# Patient Record
Sex: MALE | Race: WHITE | NOT HISPANIC OR LATINO | URBAN - METROPOLITAN AREA
[De-identification: names, ages, dates, MRNs, and addresses within clinical notes are randomized per-mention and may not be internally consistent; named-entity substitution may affect disease eponyms.]

---

## 2017-01-05 ENCOUNTER — COMMUNICATION - HEALTHEAST (OUTPATIENT)
Dept: PEDIATRICS | Facility: CLINIC | Age: 22
End: 2017-01-05

## 2017-01-05 DIAGNOSIS — J45.40 MODERATE PERSISTENT ASTHMA WITHOUT COMPLICATION: ICD-10-CM

## 2017-01-06 ENCOUNTER — COMMUNICATION - HEALTHEAST (OUTPATIENT)
Dept: PEDIATRICS | Facility: CLINIC | Age: 22
End: 2017-01-06

## 2017-03-07 ENCOUNTER — COMMUNICATION - HEALTHEAST (OUTPATIENT)
Dept: PEDIATRICS | Facility: CLINIC | Age: 22
End: 2017-03-07

## 2017-03-07 DIAGNOSIS — J45.40 MODERATE PERSISTENT ASTHMA WITHOUT COMPLICATION: ICD-10-CM

## 2017-03-31 ENCOUNTER — OFFICE VISIT - HEALTHEAST (OUTPATIENT)
Dept: PEDIATRICS | Facility: CLINIC | Age: 22
End: 2017-03-31

## 2017-03-31 DIAGNOSIS — F41.9 ANXIETY: ICD-10-CM

## 2017-04-26 ENCOUNTER — OFFICE VISIT - HEALTHEAST (OUTPATIENT)
Dept: PEDIATRICS | Facility: CLINIC | Age: 22
End: 2017-04-26

## 2017-04-26 DIAGNOSIS — F41.9 ANXIETY: ICD-10-CM

## 2017-04-26 DIAGNOSIS — J45.20 INTERMITTENT ASTHMA WITHOUT COMPLICATION: ICD-10-CM

## 2017-04-26 DIAGNOSIS — H92.01 OTALGIA, RIGHT: ICD-10-CM

## 2017-05-17 ENCOUNTER — RECORDS - HEALTHEAST (OUTPATIENT)
Dept: ADMINISTRATIVE | Facility: OTHER | Age: 22
End: 2017-05-17

## 2018-12-10 ENCOUNTER — COMMUNICATION - HEALTHEAST (OUTPATIENT)
Dept: PEDIATRICS | Facility: CLINIC | Age: 23
End: 2018-12-10

## 2019-06-13 ENCOUNTER — COMMUNICATION - HEALTHEAST (OUTPATIENT)
Dept: PEDIATRICS | Facility: CLINIC | Age: 24
End: 2019-06-13

## 2019-06-14 ENCOUNTER — OFFICE VISIT - HEALTHEAST (OUTPATIENT)
Dept: PEDIATRICS | Facility: CLINIC | Age: 24
End: 2019-06-14

## 2019-06-14 DIAGNOSIS — L30.9 ECZEMA, UNSPECIFIED TYPE: ICD-10-CM

## 2019-06-14 DIAGNOSIS — B08.1 MOLLUSCUM CONTAGIOSUM: ICD-10-CM

## 2019-06-14 DIAGNOSIS — J45.20 MILD INTERMITTENT ASTHMA WITHOUT COMPLICATION: ICD-10-CM

## 2019-06-14 RX ORDER — HYDROCORTISONE 25 MG/G
OINTMENT TOPICAL 2 TIMES DAILY PRN
Qty: 30 G | Refills: 1 | Status: SHIPPED | OUTPATIENT
Start: 2019-06-14

## 2019-06-14 ASSESSMENT — MIFFLIN-ST. JEOR: SCORE: 1597.25

## 2019-11-05 ENCOUNTER — COMMUNICATION - HEALTHEAST (OUTPATIENT)
Dept: PEDIATRICS | Facility: CLINIC | Age: 24
End: 2019-11-05

## 2019-11-05 DIAGNOSIS — J45.20 MILD INTERMITTENT ASTHMA WITHOUT COMPLICATION: ICD-10-CM

## 2019-11-06 ENCOUNTER — COMMUNICATION - HEALTHEAST (OUTPATIENT)
Dept: PEDIATRICS | Facility: CLINIC | Age: 24
End: 2019-11-06

## 2019-11-06 DIAGNOSIS — J45.20 MILD INTERMITTENT ASTHMA WITHOUT COMPLICATION: ICD-10-CM

## 2019-11-06 RX ORDER — ALBUTEROL SULFATE 90 UG/1
2 AEROSOL, METERED RESPIRATORY (INHALATION) EVERY 4 HOURS PRN
Qty: 18 G | Refills: 5 | Status: SHIPPED | OUTPATIENT
Start: 2019-11-06

## 2021-05-28 ENCOUNTER — RECORDS - HEALTHEAST (OUTPATIENT)
Dept: ADMINISTRATIVE | Facility: CLINIC | Age: 26
End: 2021-05-28

## 2021-05-29 NOTE — TELEPHONE ENCOUNTER
I am okay with seeing Rupesh, and will help him transition to adult medicine at our visit.  Please help him schedule a visit with me.

## 2021-05-29 NOTE — PROGRESS NOTES
ASSESSMENT:  1. Eczema, unspecified type  I suggested starting frequent emollient application to the rash on his left arm and his right lower back, and prescription was given for hydrocortisone, as below, to apply twice daily to flares.    - hydrocortisone 2.5 % ointment; Apply topically 2 (two) times a day as needed.  Dispense: 30 g; Refill: 1    2. Mild intermittent asthma without complication  Refill is given on albuterol inhaler, and we reviewed albuterol use before physical activity, if needed.    - VENTOLIN HFA 90 mcg/actuation inhaler; Inhale 2 puffs every 4 (four) hours as needed for wheezing.  Dispense: 18 g; Refill: 0    3. Molluscum contagiosum  Cryotherapy was applied x3 to 7 lesions, with liquid nitrogen, with greater than 10-second thaw each time, without complication.  We discussed the epidemiology of molluscum contagiosum infection, and prevention.  Written information was provided.    Td and HPV vaccine will be given today.    We discussed transition to adult care, and I encouraged Rupesh to schedule a visit with one of our  physicians.      PLAN:  There are no Patient Instructions on file for this visit.    Orders Placed This Encounter   Procedures     Td, Adult, Adsorbed (blue label)     HPV vaccine 9 valent 3 dose IM     Medications Discontinued During This Encounter   Medication Reason     VENTOLIN HFA 90 mcg/actuation inhaler Reorder       No follow-ups on file.    CHIEF COMPLAINT:  Chief Complaint   Patient presents with     Rash     x 3-4 months on the left arm and on the hip- has some bumps from the belly button to the groin area.       HISTORY OF PRESENT ILLNESS:  Jeet is a 23 y.o. male presenting to the clinic today with several concerns.  He would like to discuss transition to adult care.  He has also had a mildly dry and itchy rash on his left upper arm and his right lower back for the last several months.  He is wondering whether it is ringworm.  He has not applied any OTC products.   "He is also had a rash for the past 2 weeks that he describes as bumps between his umbilicus in his groin.  He is sexually active in a monogamous relationship.      REVIEW OF SYSTEMS:    All other systems are negative.    PFSH:  He is currently working in [place], at [employer], and going to college, to study finance.    TOBACCO USE:  Social History     Tobacco Use   Smoking Status Never Smoker   Smokeless Tobacco Never Used       VITALS:  Vitals:    06/14/19 1644   BP: 110/80   Patient Site: Left Arm   Patient Position: Sitting   Cuff Size: Adult Regular   Pulse: 82   Temp: 97.8  F (36.6  C)   TempSrc: Oral   Weight: 141 lb 11.2 oz (64.3 kg)   Height: 5' 8\" (1.727 m)     Wt Readings from Last 3 Encounters:   06/14/19 141 lb 11.2 oz (64.3 kg)   04/26/17 142 lb 9.6 oz (64.7 kg)   08/09/16 144 lb 11.2 oz (65.6 kg)     Body mass index is 21.55 kg/m .    PHYSICAL EXAM:  Alert, no acute distress.   Skin, there are mildly erythematous diffuse plaques without visible scale or lichenification on the left medial distal bicep and right lower back.  There are also multiple molluscum contagiosum lesions on the lower abdomen.      MEDICATIONS:  Current Outpatient Medications   Medication Sig Dispense Refill     VENTOLIN HFA 90 mcg/actuation inhaler Inhale 2 puffs every 4 (four) hours as needed for wheezing. 18 g 0     hydrocortisone 2.5 % ointment Apply topically 2 (two) times a day as needed. 30 g 1     No current facility-administered medications for this visit.            "

## 2021-05-29 NOTE — TELEPHONE ENCOUNTER
New Appointment Needed  What is the reason for the visit:  This patient has requested via [employer] to see Dr. Lau.  Patient is 23 years old, unsure if this patient can continue seeing him at this age.   Same Date/Next Day Appt Request  What is the reason for your visit?: Td 18+ He/Also have a rash that I would like opinion on. Inhaler refills as well.        Provider Preference: PCP only  How soon do you need to be seen?: after June 17th  Waitlist offered?: No  Okay to leave a detailed message:  Not able to ask, requested via [employer]

## 2021-05-30 VITALS — WEIGHT: 142.6 LBS | BODY MASS INDEX: 21.68 KG/M2

## 2021-06-01 ENCOUNTER — RECORDS - HEALTHEAST (OUTPATIENT)
Dept: ADMINISTRATIVE | Facility: CLINIC | Age: 26
End: 2021-06-01

## 2021-06-02 ENCOUNTER — RECORDS - HEALTHEAST (OUTPATIENT)
Dept: ADMINISTRATIVE | Facility: CLINIC | Age: 26
End: 2021-06-02

## 2021-06-03 VITALS — HEIGHT: 68 IN | WEIGHT: 141.7 LBS | BODY MASS INDEX: 21.47 KG/M2

## 2021-06-03 NOTE — TELEPHONE ENCOUNTER
Medication Question or Clarification  Who is calling: Patient  What medication are you calling about? (include dose and sig)   VENTOLIN HFA 90 mcg/actuation inhaler  2 puff, Inhalation, Every 4 hours PRN         Summary: Inhale 2 puffs every 4 (four) hours as needed for wheezing., Starting Tue 11/5/2019, Normal   Dose, Frequency: 2 puff, Every 4 hours PRN  Start: 11/5/2019  Ord/Sold: 11/5/2019 (O)          Who prescribed the medication?: Dr Lau  What is your question/concern?: The patient requested this medication be sent to [address]. Please advise.  Pharmacy:   Okay to leave a detailed message?: Yes  Site CMT - Please call the pharmacy to obtain any additional needed information.  
02-Sep-2018 20:17

## 2021-06-03 NOTE — TELEPHONE ENCOUNTER
Refill Approved    Rx renewed per Medication Renewal Policy. Medication was last renewed on 6/14/19.    Iona Pratt, [facility] Triage/Med Refill 11/5/2019     Requested Prescriptions   Pending Prescriptions Disp Refills     VENTOLIN HFA 90 mcg/actuation inhaler 18 g 0     Sig: Inhale 2 puffs every 4 (four) hours as needed for wheezing.       Albuterol/Levalbuterol Refill Protocol Passed - 11/5/2019  9:28 PM        Passed - PCP or prescribing provider visit in last year     Last office visit with prescriber/PCP: 6/14/2019 Mauro Lau MD OR same dept: 6/14/2019 Mauro Lau MD OR same specialty: 6/14/2019 Mauro Lau MD Last physical: Visit date not found       Next appt within 3 mo: Visit date not found  Next physical within 3 mo: Visit date not found  Prescriber OR PCP: Mauro Lau MD  Last diagnosis associated with med order: 1. Mild intermittent asthma without complication  - VENTOLIN HFA 90 mcg/actuation inhaler; Inhale 2 puffs every 4 (four) hours as needed for wheezing.  Dispense: 18 g; Refill: 0    If protocol passes may refill for 6 months if within 3 months of last provider visit (or a total of 9 months). If patient requesting >1 inhaler per month refill x 6 months and have patient make appointment with provider.

## 2021-06-09 NOTE — PROGRESS NOTES
ASSESSMENT/PLAN:  1. Anxiety  We discussed anxiety and depression signs, symptoms, and treatment.  Rupesh verbally contracted with me for safety.  We discussed potential benefits of resuming any medication.  He would like to resume sertraline, but at a lower dose.  Prescriptions given for 25 mg, as below.  We discussed indications for psychiatric consultation.  Kudos were given for his stopping his cannabis use.  Return in 1 month for follow-up, or sooner as needed.  I encouraged him to contact me with concerns or questions.  We also discussed transition to adult care, and he will schedule a preventive health visit with 1 of the family medicine providers.    - sertraline (ZOLOFT) 25 MG tablet; Take 1 tablet (25 mg total) by mouth daily.  Dispense: 30 tablet; Refill: 2    (Approximately 15 out of 25 minutes was spent in education, counseling, and care coordination)     There are no Patient Instructions on file for this visit.    No orders of the defined types were placed in this encounter.    There are no discontinued medications.    No Follow-up on file.    CHIEF COMPLAINT:  Chief Complaint   Patient presents with     Referral     psyc        HISTORY OF PRESENT ILLNESS:  Jeet is a 21 y.o. male presenting to the clinic today requesting a referral for psychiatry.    He has a history of anxiety and depression. He has stopped using cannabis to self-medicate himself. He occasionally drinks a beer but does not consume alcohol often. He was taking citalopram and then sertraline but stopped using it about 1.5 years ago. He began self-medicating at that time. He does not think his symptoms are worse than they have been but he is going through some major life transitions at this time. He recently ended a 5 year intimate relationship because he realized it was not a healthy one for either party involved. He has episodes of anxiety and sadness but he manages them well with techniques he has learned in therapy over the years.  He uses episodic breathing and places his hands on his lap to help calm himself. He has been safe. He denies thoughts of self-harm or harming others. He states he would like to see a psychiatrist but is not sure he would like to resume medication management. He is not concerned about conditions other than anxiety and depression. He used to see a family therapist which he found helpful. He last underwent individual therapy 8-9 years ago. He states he made the appointment for today because he was expecting to become more anxious over the past week since he stopped self-medicating but he has been feeling well. He recalls having to give a presentation in class earlier this week and not experiencing anxiety about it. He denies any hallucinations or hearing nonexistent voices. He would like to try resuming an anti-anxiety medication for a month and then checking in on his progress. He did not like citalopram because he developed a flat affect. He did not like sertraline because his appetite was suppressed. He is agreeable to trying 25 mg of sertraline per day. He wants a low dose medication because his anxiety is improved. He notes his ADHD is also significantly improved. He is able to focus and be engaged in class. He still fidgets but it does not interfere with his concentration. He recalls one instance of cutting himself during high school but he did not like it so he stopped doing it.    PHQ-9 Total Score: 2 (3/31/2017  3:00 PM)  ANIKA-7 Total: 4 (3/31/2017  3:00 PM)    REVIEW OF SYSTEMS:   He has been using his Flovent inhaler regularly which has helped him clear mucus from his lungs. His mucus is colored at times. He has not needed his Ventolin albuterol inhaler often since he stopped smoking cannabis. He sleeps well at night. He has an altered sleep schedule and usually goes to bed around 2-3 am because of his work. All other systems are negative.    ACT Total Score: 21 (3/31/2017  3:00 PM)    PFSH:  He works  part-time at [employer]. 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    TOBACCO USE:  History   Smoking Status     Never Smoker   Smokeless Tobacco     Never Used     VITALS:  There were no vitals filed for this visit.  Wt Readings from Last 3 Encounters:   08/09/16 144 lb 11.2 oz (65.6 kg)   05/10/16 135 lb 12.8 oz (61.6 kg)   08/24/15 158 lb 9 oz (71.9 kg)     There is no height or weight on file to calculate BMI.    PHYSICAL EXAM:  Alert, no acute distress.  Mood is euthymic, affect is congruent.  There is good eye contact with the examiner.  Patient appears relaxed and well groomed.  No psychomotor agitation or retardation.  Thought form seems logical and some insight is demonstrated.  No pressured speech.    ADDITIONAL HISTORY SUMMARIZED (2): None.  DECISION TO OBTAIN EXTRA INFORMATION (1): None.   RADIOLOGY TESTS (1): None.  LABS (1): None.  MEDICINE TESTS (1): None.  INDEPENDENT REVIEW (2 each): None.     The visit lasted a total of 25 minutes face to face with the patient. Over 50% of the time was spent counseling and educating the patient about his anxiety and management plan.    I, Yousif Zimmerman, am scribing for and in the presence of, Dr. Lau.    I, Dr. Lau, personally performed the services described in this documentation, as scribed by Yousif Zimmerman in my presence, and it is both accurate and complete.    MEDICATIONS:  Current Outpatient Prescriptions   Medication Sig Dispense Refill     fluticasone (FLOVENT HFA) 110 mcg/actuation inhaler Inhale 2 puffs 2 (two) times a day. 3 Inhaler 1     VENTOLIN HFA 90 mcg/actuation inhaler Inhale 2 puffs every 4 (four) hours as needed for wheezing. 18 g 0     sertraline (ZOLOFT) 25 MG tablet Take 1 tablet (25 mg total) by mouth daily. 30 tablet 2     No current facility-administered medications for this visit.        Total data points: 0

## 2021-06-10 NOTE — PROGRESS NOTES
"Assessment     21 y.o. male  1. Anxiety    2. Otalgia, right    3. Intermittent asthma without complication        Plan:     No results found for this or any previous visit (from the past 24 hour(s)).      1. Anxiety  Continue sertraline, same dose.  We discussed anxiety and depression signs, symptoms, treatment, and potential benefits of CBT and working with psychology.  Return in 2-3 months, sooner with new or worsening symptoms.     - sertraline (ZOLOFT) 25 MG tablet; Take 1 tablet (25 mg total) by mouth daily.  Dispense: 30 tablet; Refill: 2    2. Otalgia, right  Reassurance was given regarding the absence of otitis media or externa.  Kudos were given on his stopping smoking, and I suggested a trial ibuprofen or naproxen.  Referral is made to ENT, if there is no improvement over the next several days.    - Ambulatory referral to ENT    3.  Intermittent asthma  We reviewed indications for using albuterol and for resuming controller medication and for seeking medical attention.    Lastly, we discussed transition to adult care, and Rupesh will schedule a preventive health visit in the next 2-3 months.        (Approximately 15 out of 25 minutes was spent in education, counseling, and care coordination)     Subjective:      HPI: Jeet Taylor is a 21 y.o. male here for follow-up after restarting sertraline several weeks ago for anxiety.  He has been taking 25 mg daily and feels this is been working well for him.  He has not been having \"flat emotions\" or jitteriness he had in the past at higher doses.  He has been feeling stressed, with school work.  He is had no panic attacks since his last visit.  He denies feeling down or depressed, recurrent thoughts of death or dying, or thoughts of self-harm.  He is not been cutting.  He has not used cannabis since his last visit, or other substances other than alcohol.  He drinks 1-3 beers, not on a daily basis, frequently to intoxication.  He is sleeping well, without sleep " onset or sleep maintenance insomnia.    A second concern today is right ear pain.  He has had intermittent right ear pain for the past 3 weeks.  He was evaluated at [facility] and was felt to have fluid behind the ear.  He has had no popping.  He is having some TMJ discomfort on the right as well.  He has not been using Flovent, and has had no asthma symptoms since he stopped smoking.  ACT equals 25 today.    Past Medical History:   Diagnosis Date     Anxiety      Concussion      Depression      History of petit-mal seizures      Lyme disease      REBECCA (obstructive sleep apnea)      Substance abuse      Past Surgical History:   Procedure Laterality Date     OK REMOVE TONSILS/ADENOIDS,<13 Y/O      Description: Tonsillectomy With Adenoidectomy;  Recorded: 06/29/2011;     Codeine sulfate and Oxycodone-acetaminophen  Outpatient Medications Prior to Visit   Medication Sig Dispense Refill     VENTOLIN HFA 90 mcg/actuation inhaler Inhale 2 puffs every 4 (four) hours as needed for wheezing. 18 g 0     fluticasone (FLOVENT HFA) 110 mcg/actuation inhaler Inhale 2 puffs 2 (two) times a day. 3 Inhaler 1     sertraline (ZOLOFT) 25 MG tablet Take 1 tablet (25 mg total) by mouth daily. 30 tablet 2     No facility-administered medications prior to visit.      Family History   Problem Relation Age of Onset     ADD / ADHD Brother      Social History     Social History Narrative    Parents .     Patient Active Problem List   Diagnosis     Anxiety     Intermittent asthma without complication       Review of Systems  Pertinent ROS noted in HPI      Objective:     Vitals:    04/26/17 1426   BP: 110/70   Temp: 98.1  F (36.7  C)   TempSrc: Oral   Weight: 142 lb 9.6 oz (64.7 kg)       Physical Exam:     Alert, no acute distress.  Mood and affect seems neutral.  No psychomotor agitation or retardation.  There is good eye contact with the examiner, and he appears relaxed and well groomed.  No pressured speech.  Thought form seems  logical, and insight as demonstrated.  HEENT, conjunctivae are clear, external auditory canals appear normal.  There is no right mastoid process tenderness or tenderness with mobility of the pinna.  TMs are without erythema, pus or fluid. Position and landmarks are normal.  Oropharynx is moist and clear, without tonsillar hypertrophy, asymmetry, exudate or lesions.  Neck is supple without adenopathy or thyromegaly.  Lungs have good air entry bilaterally, no wheezes or crackles.  No prolongation of expiratory phase.   No tachypnea, retractions, or increased work of breathing.  Neuro, cranial nerves are grossly intact.

## 2021-07-31 ENCOUNTER — HEALTH MAINTENANCE LETTER (OUTPATIENT)
Age: 26
End: 2021-07-31

## 2021-09-19 ENCOUNTER — HEALTH MAINTENANCE LETTER (OUTPATIENT)
Age: 26
End: 2021-09-19

## 2022-08-21 ENCOUNTER — HEALTH MAINTENANCE LETTER (OUTPATIENT)
Age: 27
End: 2022-08-21

## 2022-09-17 ENCOUNTER — OFFICE VISIT (OUTPATIENT)
Dept: URGENT CARE | Facility: URGENT CARE | Age: 27
End: 2022-09-17
Payer: COMMERCIAL

## 2022-09-17 VITALS
WEIGHT: 171.13 LBS | BODY MASS INDEX: 26.02 KG/M2 | DIASTOLIC BLOOD PRESSURE: 79 MMHG | SYSTOLIC BLOOD PRESSURE: 124 MMHG | TEMPERATURE: 98.1 F | HEART RATE: 74 BPM | OXYGEN SATURATION: 98 %

## 2022-09-17 DIAGNOSIS — J36 PERITONSILLAR ABSCESS: ICD-10-CM

## 2022-09-17 DIAGNOSIS — R50.9 ACUTE FEBRILE ILLNESS: Primary | ICD-10-CM

## 2022-09-17 DIAGNOSIS — Z90.89 HISTORY OF TONSILLECTOMY: ICD-10-CM

## 2022-09-17 DIAGNOSIS — J02.9 SORE THROAT: ICD-10-CM

## 2022-09-17 LAB
BASOPHILS # BLD AUTO: 0 10E3/UL (ref 0–0.2)
BASOPHILS NFR BLD AUTO: 0 %
DEPRECATED S PYO AG THROAT QL EIA: NEGATIVE
EOSINOPHIL # BLD AUTO: 0.1 10E3/UL (ref 0–0.7)
EOSINOPHIL NFR BLD AUTO: 0 %
ERYTHROCYTE [DISTWIDTH] IN BLOOD BY AUTOMATED COUNT: 13.2 % (ref 10–15)
GROUP A STREP BY PCR: NOT DETECTED
HCT VFR BLD AUTO: 44.8 % (ref 40–53)
HGB BLD-MCNC: 15.3 G/DL (ref 13.3–17.7)
LYMPHOCYTES # BLD AUTO: 1.3 10E3/UL (ref 0.8–5.3)
LYMPHOCYTES NFR BLD AUTO: 7 %
MCH RBC QN AUTO: 30.4 PG (ref 26.5–33)
MCHC RBC AUTO-ENTMCNC: 34.2 G/DL (ref 31.5–36.5)
MCV RBC AUTO: 89 FL (ref 78–100)
MONOCYTES # BLD AUTO: 2.6 10E3/UL (ref 0–1.3)
MONOCYTES NFR BLD AUTO: 13 %
MONOCYTES NFR BLD AUTO: NEGATIVE %
NEUTROPHILS # BLD AUTO: 15.5 10E3/UL (ref 1.6–8.3)
NEUTROPHILS NFR BLD AUTO: 80 %
PLATELET # BLD AUTO: 216 10E3/UL (ref 150–450)
RBC # BLD AUTO: 5.04 10E6/UL (ref 4.4–5.9)
WBC # BLD AUTO: 19.4 10E3/UL (ref 4–11)

## 2022-09-17 PROCEDURE — U0003 INFECTIOUS AGENT DETECTION BY NUCLEIC ACID (DNA OR RNA); SEVERE ACUTE RESPIRATORY SYNDROME CORONAVIRUS 2 (SARS-COV-2) (CORONAVIRUS DISEASE [COVID-19]), AMPLIFIED PROBE TECHNIQUE, MAKING USE OF HIGH THROUGHPUT TECHNOLOGIES AS DESCRIBED BY CMS-2020-01-R: HCPCS | Performed by: INTERNAL MEDICINE

## 2022-09-17 PROCEDURE — 36415 COLL VENOUS BLD VENIPUNCTURE: CPT | Performed by: INTERNAL MEDICINE

## 2022-09-17 PROCEDURE — U0005 INFEC AGEN DETEC AMPLI PROBE: HCPCS | Performed by: INTERNAL MEDICINE

## 2022-09-17 PROCEDURE — 87591 N.GONORRHOEAE DNA AMP PROB: CPT | Performed by: INTERNAL MEDICINE

## 2022-09-17 PROCEDURE — 87651 STREP A DNA AMP PROBE: CPT | Performed by: INTERNAL MEDICINE

## 2022-09-17 PROCEDURE — 99204 OFFICE O/P NEW MOD 45 MIN: CPT | Mod: CS | Performed by: INTERNAL MEDICINE

## 2022-09-17 PROCEDURE — 85025 COMPLETE CBC W/AUTO DIFF WBC: CPT | Performed by: INTERNAL MEDICINE

## 2022-09-17 PROCEDURE — 86308 HETEROPHILE ANTIBODY SCREEN: CPT | Performed by: INTERNAL MEDICINE

## 2022-09-17 PROCEDURE — 87491 CHLMYD TRACH DNA AMP PROBE: CPT | Performed by: INTERNAL MEDICINE

## 2022-09-17 RX ORDER — DEXAMETHASONE 4 MG/1
TABLET ORAL
COMMUNITY
Start: 2022-07-16

## 2022-09-17 ASSESSMENT — ENCOUNTER SYMPTOMS
FEVER: 1
COUGH: 0
RHINORRHEA: 0
APPETITE CHANGE: 1
MYALGIAS: 0

## 2022-09-17 NOTE — PATIENT INSTRUCTIONS
Strep test is negative.  Your white count is elevated.  With symptoms of sore throat and right posterior pharynx enlargement and right-sided neck lymph nodes, suspicious for peritonsillar abscess.    Will start you on oral antibiotics Augmentin 1 tablet twice daily for 10 days      Continue to monitor fever  Tylenol, Ibuprofen, Fluids, Rest, Saline gargles       If symptoms are not improving as expected then recommend recheck or see ear nose and throat specialist.  If you are having difficulty swallowing or symptoms worsen or concerns with dehydration then go to the emergency room.    Component      Latest Ref Rng & Units 9/17/2022   WBC      4.0 - 11.0 10e3/uL 19.4 (H)   RBC Count      4.40 - 5.90 10e6/uL 5.04   Hemoglobin      13.3 - 17.7 g/dL 15.3   Hematocrit      40.0 - 53.0 % 44.8   MCV      78 - 100 fL 89   MCH      26.5 - 33.0 pg 30.4   MCHC      31.5 - 36.5 g/dL 34.2   RDW      10.0 - 15.0 % 13.2   Platelet Count      150 - 450 10e3/uL 216   % Neutrophils      % 80   % Lymphocytes      % 7   % Monocytes      % 13   % Eosinophils      % 0   % Basophils      % 0   Absolute Neutrophils      1.6 - 8.3 10e3/uL 15.5 (H)   Absolute Lymphocytes      0.8 - 5.3 10e3/uL 1.3   Absolute Monocytes      0.0 - 1.3 10e3/uL 2.6 (H)   Absolute Eosinophils      0.0 - 0.7 10e3/uL 0.1   Absolute Basophils      0.0 - 0.2 10e3/uL 0.0   Rapid Strep A Screen      Negative Negative   Mononucleosis Screen      Negative Negative

## 2022-09-17 NOTE — LETTER
[facility]  [address] [facility] [address]  Phone: [phone]    September 17, 2022        Jeet Taylor  [address] [facility] [address]          To whom it may concern:    RE: Jeet Taylor    Patient was seen and treated today at our clinic and missed work.  Please excuse work absence for up to 3 more days from today's visit for illness.    Please contact me for questions or concerns.      Sincerely,        Itzel Valdes MD

## 2022-09-18 LAB — SARS-COV-2 RNA RESP QL NAA+PROBE: NEGATIVE

## 2022-09-19 LAB
C TRACH DNA SPEC QL PROBE+SIG AMP: NEGATIVE
N GONORRHOEA DNA SPEC QL NAA+PROBE: POSITIVE

## 2022-09-20 ENCOUNTER — OFFICE VISIT (OUTPATIENT)
Dept: URGENT CARE | Facility: URGENT CARE | Age: 27
End: 2022-09-20
Payer: COMMERCIAL

## 2022-09-20 VITALS
OXYGEN SATURATION: 98 % | HEART RATE: 91 BPM | SYSTOLIC BLOOD PRESSURE: 151 MMHG | TEMPERATURE: 97.8 F | BODY MASS INDEX: 26.12 KG/M2 | DIASTOLIC BLOOD PRESSURE: 79 MMHG | WEIGHT: 171.8 LBS

## 2022-09-20 DIAGNOSIS — A54.9 GONORRHEA: Primary | ICD-10-CM

## 2022-09-20 PROCEDURE — 99212 OFFICE O/P EST SF 10 MIN: CPT | Mod: 25 | Performed by: PHYSICIAN ASSISTANT

## 2022-09-20 PROCEDURE — 96372 THER/PROPH/DIAG INJ SC/IM: CPT | Performed by: PHYSICIAN ASSISTANT

## 2022-09-20 ASSESSMENT — ENCOUNTER SYMPTOMS
CHEST TIGHTNESS: 0
SHORTNESS OF BREATH: 0
CARDIOVASCULAR NEGATIVE: 1
FREQUENCY: 0
CONSTITUTIONAL NEGATIVE: 1
ABDOMINAL PAIN: 0
CHILLS: 0
PALPITATIONS: 0
MYALGIAS: 0
DIARRHEA: 0
DYSURIA: 0
RESPIRATORY NEGATIVE: 1
VOMITING: 0
HEMATURIA: 0
ALLERGIC/IMMUNOLOGIC NEGATIVE: 1
GASTROINTESTINAL NEGATIVE: 1
HEADACHES: 0
COUGH: 0
MUSCULOSKELETAL NEGATIVE: 1
SORE THROAT: 0
FEVER: 0
WHEEZING: 0
NAUSEA: 0
NEUROLOGICAL NEGATIVE: 1

## 2022-09-20 NOTE — NURSING NOTE
Clinic Administered Medication Documentation    Administrations This Visit     cefTRIAXone (ROCEPHIN) injection 500 mg     Admin Date  09/20/2022 Action  Given Dose  500 mg Route  Intramuscular Site  Right Gluteus Amador Administered By  Jessica Ya CMA    Ordering Provider: Guillermo Fuller PA-C    Patient Supplied?: No                  Injectable Medication Documentation    Patient was given Ceftriaxone Sodium (Rocephin). Prior to medication administration, verified patients identity using patient s name and date of birth. Please see MAR and medication order for additional information. Patient instructed to remain in clinic for 15 minutes.      Was entire vial of medication used? Yes  Vial/Syringe: Single dose vial  Expiration Date:  062024  Was this medication supplied by the patient? No

## 2022-09-20 NOTE — PROGRESS NOTES
Chief Complaint:    Chief Complaint   Patient presents with     Imm/Inj     Needs rocephin injection for positive STD results.     ASSESSMENT     1. Gonorrhea       PLAN    Patient given 500 Mg Rocephin IM in clinic today.  Patient verbalized understanding and agreed with this plan.    Labs:     No results found for any visits on 09/20/22.    Problem history    Patient Active Problem List   Diagnosis     Anxiety     Intermittent asthma without complication       Current Meds    Current Outpatient Medications:      amoxicillin-clavulanate (AUGMENTIN) 875-125 MG tablet, Take 1 tablet by mouth 2 times daily for 10 days, Disp: 20 tablet, Rfl: 0     FLOVENT  MCG/ACT inhaler, , Disp: , Rfl:      hydrocortisone 2.5 % ointment, [HYDROCORTISONE 2.5 % OINTMENT] Apply topically 2 (two) times a day as needed. (Patient not taking: No sig reported), Disp: 30 g, Rfl: 1     VENTOLIN HFA 90 mcg/actuation inhaler, [VENTOLIN HFA 90 MCG/ACTUATION INHALER] Inhale 2 puffs every 4 (four) hours as needed for wheezing. (Patient not taking: Reported on 9/20/2022), Disp: 18 g, Rfl: 5    Current Facility-Administered Medications:      cefTRIAXone (ROCEPHIN) injection 500 mg, 500 mg, Intramuscular, Once, Guillermo Fuller PA-C    Allergies  Allergies   Allergen Reactions     Codeine Sulfate [Codeine] Unknown     Oxycodone-Acetaminophen Unknown       SUBJECTIVE    HPI:  Jeet Taylor is a 27 year old male who presents for injection for positive Gonorrhea testing.  No other complaints today..    ROS:      Review of Systems   Constitutional: Negative.  Negative for chills and fever.   HENT: Negative.  Negative for sore throat.    Respiratory: Negative.  Negative for cough, chest tightness, shortness of breath and wheezing.    Cardiovascular: Negative.  Negative for chest pain and palpitations.   Gastrointestinal: Negative.  Negative for abdominal pain, diarrhea, nausea and vomiting.   Genitourinary: Negative for dysuria, frequency,  hematuria and urgency.   Musculoskeletal: Negative.  Negative for myalgias.   Skin: Negative for rash.   Allergic/Immunologic: Negative.  Negative for immunocompromised state.   Neurological: Negative.  Negative for headaches.       Family History   Family History   Problem Relation Age of Onset     Attention Deficit Disorder Brother         Social History  Social History     Socioeconomic History     Marital status: Single     Spouse name: Not on file     Number of children: Not on file     Years of education: Not on file     Highest education level: Not on file   Occupational History     Not on file   Tobacco Use     Smoking status: Never Smoker     Smokeless tobacco: Current User     Types: Chew   Substance and Sexual Activity     Alcohol use: Yes     Drug use: Yes     Types: Marijuana     Sexual activity: Not on file   Other Topics Concern     Not on file   Social History Narrative    Parents .     Social Determinants of Health     Financial Resource Strain: Not on file   Food Insecurity: Not on file   Transportation Needs: Not on file   Physical Activity: Not on file   Stress: Not on file   Social Connections: Not on file   Intimate Partner Violence: Not on file   Housing Stability: Not on file           OBJECTIVE     Vital signs noted and reviewed by Guillermo Fuller PA-C  BP (!) 151/79   Pulse 91   Temp 97.8  F (36.6  C) (Tympanic)   Wt 77.9 kg (171 lb 12.8 oz)   SpO2 98%   BMI 26.12 kg/m       Physical Exam  Vitals and nursing note reviewed.   Constitutional:       General: He is not in acute distress.     Appearance: He is well-developed. He is not ill-appearing, toxic-appearing or diaphoretic.   HENT:      Head: Normocephalic and atraumatic.      Right Ear: Hearing, tympanic membrane, ear canal and external ear normal. Tympanic membrane is not perforated, erythematous, retracted or bulging.      Left Ear: Hearing, tympanic membrane, ear canal and external ear normal. Tympanic membrane is not  perforated, erythematous, retracted or bulging.      Nose: Nose normal. No mucosal edema, congestion or rhinorrhea.      Mouth/Throat:      Pharynx: No oropharyngeal exudate or posterior oropharyngeal erythema.      Tonsils: No tonsillar exudate or tonsillar abscesses. 0 on the right. 0 on the left.   Eyes:      Pupils: Pupils are equal, round, and reactive to light.   Cardiovascular:      Rate and Rhythm: Normal rate and regular rhythm.      Heart sounds: Normal heart sounds, S1 normal and S2 normal. Heart sounds not distant. No murmur heard.    No friction rub. No gallop.   Pulmonary:      Effort: Pulmonary effort is normal. No respiratory distress.      Breath sounds: Normal breath sounds. No decreased breath sounds, wheezing, rhonchi or rales.   Abdominal:      General: Bowel sounds are normal. There is no distension.      Palpations: Abdomen is soft.      Tenderness: There is no abdominal tenderness.   Musculoskeletal:      Cervical back: Normal range of motion and neck supple.   Lymphadenopathy:      Cervical: No cervical adenopathy.   Skin:     General: Skin is warm and dry.      Findings: No rash.   Neurological:      Mental Status: He is alert.      Cranial Nerves: No cranial nerve deficit.   Psychiatric:         Attention and Perception: He is attentive.         Speech: Speech normal.         Behavior: Behavior normal. Behavior is cooperative.         Thought Content: Thought content normal.         Judgment: Judgment normal.             Guillermo Fuller PA-C  9/20/2022, 11:52 AM

## 2022-12-25 ENCOUNTER — HEALTH MAINTENANCE LETTER (OUTPATIENT)
Age: 27
End: 2022-12-25

## 2023-09-17 ENCOUNTER — HEALTH MAINTENANCE LETTER (OUTPATIENT)
Age: 28
End: 2023-09-17

## 2024-11-10 ENCOUNTER — HEALTH MAINTENANCE LETTER (OUTPATIENT)
Age: 29
End: 2024-11-10